# Patient Record
Sex: FEMALE | Race: WHITE | NOT HISPANIC OR LATINO | Employment: UNEMPLOYED | URBAN - METROPOLITAN AREA
[De-identification: names, ages, dates, MRNs, and addresses within clinical notes are randomized per-mention and may not be internally consistent; named-entity substitution may affect disease eponyms.]

---

## 2017-01-01 ENCOUNTER — APPOINTMENT (OUTPATIENT)
Dept: NEUROLOGY | Facility: HOSPITAL | Age: 82
End: 2017-01-01

## 2017-01-01 ENCOUNTER — APPOINTMENT (OUTPATIENT)
Dept: GENERAL RADIOLOGY | Facility: HOSPITAL | Age: 82
End: 2017-01-01

## 2017-01-01 ENCOUNTER — OFFICE VISIT (OUTPATIENT)
Dept: CARDIOLOGY | Facility: CLINIC | Age: 82
End: 2017-01-01

## 2017-01-01 ENCOUNTER — HOSPITAL ENCOUNTER (INPATIENT)
Facility: HOSPITAL | Age: 82
LOS: 1 days | End: 2017-04-19
Attending: EMERGENCY MEDICINE | Admitting: INTERNAL MEDICINE

## 2017-01-01 ENCOUNTER — APPOINTMENT (OUTPATIENT)
Dept: CT IMAGING | Facility: HOSPITAL | Age: 82
End: 2017-01-01

## 2017-01-01 VITALS
SYSTOLIC BLOOD PRESSURE: 137 MMHG | BODY MASS INDEX: 29.66 KG/M2 | HEIGHT: 65 IN | HEART RATE: 149 BPM | TEMPERATURE: 101.9 F | DIASTOLIC BLOOD PRESSURE: 78 MMHG | WEIGHT: 178 LBS | OXYGEN SATURATION: 72 % | RESPIRATION RATE: 18 BRPM

## 2017-01-01 VITALS
BODY MASS INDEX: 32.39 KG/M2 | SYSTOLIC BLOOD PRESSURE: 120 MMHG | DIASTOLIC BLOOD PRESSURE: 62 MMHG | HEART RATE: 70 BPM | HEIGHT: 62 IN | WEIGHT: 176 LBS

## 2017-01-01 DIAGNOSIS — I10 ESSENTIAL HYPERTENSION: ICD-10-CM

## 2017-01-01 DIAGNOSIS — E11.69 DIABETES MELLITUS TYPE 2 IN OBESE (HCC): ICD-10-CM

## 2017-01-01 DIAGNOSIS — Z79.02 LONG TERM (CURRENT) USE OF ANTITHROMBOTICS/ANTIPLATELETS: ICD-10-CM

## 2017-01-01 DIAGNOSIS — S06.5XAA SUBDURAL HEMATOMA, ACUTE (HCC): Primary | ICD-10-CM

## 2017-01-01 DIAGNOSIS — N18.9 CKD (CHRONIC KIDNEY DISEASE), UNSPECIFIED STAGE: ICD-10-CM

## 2017-01-01 DIAGNOSIS — T85.09XA: ICD-10-CM

## 2017-01-01 DIAGNOSIS — I42.9 CARDIOMYOPATHY (HCC): ICD-10-CM

## 2017-01-01 DIAGNOSIS — E66.9 DIABETES MELLITUS TYPE 2 IN OBESE (HCC): ICD-10-CM

## 2017-01-01 DIAGNOSIS — I50.22 CONGESTIVE HEART FAILURE, NYHA CLASS III, CHRONIC, SYSTOLIC (HCC): Primary | ICD-10-CM

## 2017-01-01 LAB
ABO GROUP BLD: NORMAL
ABO GROUP BLD: NORMAL
ALBUMIN SERPL-MCNC: 3.6 G/DL (ref 3.2–4.8)
ALBUMIN/GLOB SERPL: 1.2 G/DL (ref 1.5–2.5)
ALP SERPL-CCNC: 95 U/L (ref 25–100)
ALT SERPL W P-5'-P-CCNC: 10 U/L (ref 7–40)
ALT SERPL W P-5'-P-CCNC: 12 U/L (ref 7–40)
ANION GAP SERPL CALCULATED.3IONS-SCNC: 5 MMOL/L (ref 3–11)
APTT PPP: 26.8 SECONDS (ref 24–31)
ARTERIAL PATENCY WRIST A: ABNORMAL
ARTICHOKE IGE QN: 39 MG/DL (ref 0–130)
AST SERPL-CCNC: 20 U/L (ref 0–33)
AST SERPL-CCNC: 21 U/L (ref 0–33)
ATMOSPHERIC PRESS: ABNORMAL MMHG
BACTERIA UR QL AUTO: ABNORMAL /HPF
BASE EXCESS BLDA CALC-SCNC: -0.7 MMOL/L (ref 0–2)
BASOPHILS # BLD AUTO: 0.02 10*3/MM3 (ref 0–0.2)
BASOPHILS NFR BLD AUTO: 0.2 % (ref 0–1)
BDY SITE: ABNORMAL
BILIRUB SERPL-MCNC: 0.2 MG/DL (ref 0.3–1.2)
BILIRUB UR QL STRIP: ABNORMAL
BLD GP AB SCN SERPL QL: NEGATIVE
BUN BLD-MCNC: 38 MG/DL (ref 9–23)
BUN BLDA-MCNC: 35 MG/DL (ref 8–26)
BUN/CREAT SERPL: 23.8 (ref 7–25)
CA-I BLDA-SCNC: 1.34 MMOL/L (ref 1.2–1.32)
CALCIUM SPEC-SCNC: 9.2 MG/DL (ref 8.7–10.4)
CHLORIDE BLDA-SCNC: 103 MMOL/L (ref 98–109)
CHLORIDE SERPL-SCNC: 107 MMOL/L (ref 99–109)
CHOLEST SERPL-MCNC: 111 MG/DL (ref 0–200)
CLARITY UR: ABNORMAL
CO2 BLDA-SCNC: 27 MMOL/L (ref 24–29)
CO2 BLDA-SCNC: 27.9 MMOL/L (ref 22–33)
CO2 SERPL-SCNC: 28 MMOL/L (ref 20–31)
COHGB MFR BLD: 1 % (ref 0–2)
COLOR UR: ABNORMAL
CREAT BLD-MCNC: 1.6 MG/DL (ref 0.6–1.3)
CREAT BLDA-MCNC: 1.7 MG/DL (ref 0.6–1.3)
DEPRECATED RDW RBC AUTO: 49.7 FL (ref 37–54)
EOSINOPHIL # BLD AUTO: 0.13 10*3/MM3 (ref 0.1–0.3)
EOSINOPHIL NFR BLD AUTO: 1.6 % (ref 0–3)
ERYTHROCYTE [DISTWIDTH] IN BLOOD BY AUTOMATED COUNT: 14.7 % (ref 11.3–14.5)
GFR SERPL CREATININE-BSD FRML MDRD: 31 ML/MIN/1.73
GLOBULIN UR ELPH-MCNC: 2.9 GM/DL
GLUCOSE BLD-MCNC: 162 MG/DL (ref 70–100)
GLUCOSE BLDC GLUCOMTR-MCNC: 168 MG/DL (ref 70–130)
GLUCOSE BLDC GLUCOMTR-MCNC: 205 MG/DL (ref 70–130)
GLUCOSE UR STRIP-MCNC: NEGATIVE MG/DL
HBA1C MFR BLD: 7.1 % (ref 4.8–5.6)
HCO3 BLDA-SCNC: 26.2 MMOL/L (ref 20–26)
HCT VFR BLD AUTO: 33.7 % (ref 34.5–44)
HCT VFR BLD CALC: 31.8 %
HCT VFR BLDA CALC: 31 % (ref 38–51)
HDLC SERPL-MCNC: 35 MG/DL (ref 40–60)
HGB BLD-MCNC: 10.5 G/DL (ref 11.5–15.5)
HGB BLDA-MCNC: 10.4 G/DL (ref 14–18)
HGB BLDA-MCNC: 10.5 G/DL (ref 12–17)
HGB UR QL STRIP.AUTO: NEGATIVE
HOLD SPECIMEN: NORMAL
HOROWITZ INDEX BLD+IHG-RTO: 40 %
IMM GRANULOCYTES # BLD: 0.03 10*3/MM3 (ref 0–0.03)
IMM GRANULOCYTES NFR BLD: 0.4 % (ref 0–0.6)
INR PPP: 1.3 (ref 0.8–1.2)
KETONES UR QL STRIP: NEGATIVE
LEUKOCYTE ESTERASE UR QL STRIP.AUTO: NEGATIVE
LYMPHOCYTES # BLD AUTO: 2.13 10*3/MM3 (ref 0.6–4.8)
LYMPHOCYTES NFR BLD AUTO: 26.5 % (ref 24–44)
MAGNESIUM SERPL-MCNC: 1.9 MG/DL (ref 1.3–2.7)
MCH RBC QN AUTO: 28.7 PG (ref 27–31)
MCHC RBC AUTO-ENTMCNC: 31.2 G/DL (ref 32–36)
MCV RBC AUTO: 92.1 FL (ref 80–99)
METHGB BLD QL: 1.1 % (ref 0–1.5)
MODALITY: ABNORMAL
MONOCYTES # BLD AUTO: 0.54 10*3/MM3 (ref 0–1)
MONOCYTES NFR BLD AUTO: 6.7 % (ref 0–12)
NEUTROPHILS # BLD AUTO: 5.2 10*3/MM3 (ref 1.5–8.3)
NEUTROPHILS NFR BLD AUTO: 64.6 % (ref 41–71)
NITRITE UR QL STRIP: NEGATIVE
OXYHGB MFR BLDV: 85.9 % (ref 94–99)
PCO2 BLDA: 56.9 MM HG (ref 35–45)
PH BLDA: 7.27 PH UNITS (ref 7.35–7.45)
PH UR STRIP.AUTO: 5 [PH] (ref 5–8)
PHOSPHATE SERPL-MCNC: 3.7 MG/DL (ref 2.4–5.1)
PLATELET # BLD AUTO: 163 10*3/MM3 (ref 150–450)
PMV BLD AUTO: 9.8 FL (ref 6–12)
PO2 BLDA: 59.8 MM HG (ref 83–108)
POTASSIUM BLD-SCNC: 3.9 MMOL/L (ref 3.5–5.5)
POTASSIUM BLDA-SCNC: 3.8 MMOL/L (ref 3.5–4.9)
PROT SERPL-MCNC: 6.5 G/DL (ref 5.7–8.2)
PROT UR QL STRIP: ABNORMAL
PROTHROMBIN TIME: 15 SECONDS (ref 12.8–15.2)
RBC # BLD AUTO: 3.66 10*6/MM3 (ref 3.89–5.14)
RBC # UR: ABNORMAL /HPF
REF LAB TEST METHOD: ABNORMAL
RH BLD: POSITIVE
RH BLD: POSITIVE
SODIUM BLD-SCNC: 140 MMOL/L (ref 132–146)
SODIUM BLDA-SCNC: 141 MMOL/L (ref 138–146)
SP GR UR STRIP: 1.02 (ref 1–1.03)
SQUAMOUS #/AREA URNS HPF: ABNORMAL /HPF
TRIGL SERPL-MCNC: 165 MG/DL (ref 0–150)
TROPONIN I SERPL-MCNC: 0.03 NG/ML (ref 0–0.07)
TSH SERPL DL<=0.05 MIU/L-ACNC: 1.36 MIU/ML (ref 0.35–5.35)
UROBILINOGEN UR QL STRIP: ABNORMAL
WBC NRBC COR # BLD: 8.05 10*3/MM3 (ref 3.5–10.8)
WBC UR QL AUTO: ABNORMAL /HPF
WHOLE BLOOD HOLD SPECIMEN: NORMAL
WHOLE BLOOD HOLD SPECIMEN: NORMAL

## 2017-01-01 PROCEDURE — 85014 HEMATOCRIT: CPT

## 2017-01-01 PROCEDURE — 36430 TRANSFUSION BLD/BLD COMPNT: CPT

## 2017-01-01 PROCEDURE — 84450 TRANSFERASE (AST) (SGOT): CPT | Performed by: EMERGENCY MEDICINE

## 2017-01-01 PROCEDURE — 93005 ELECTROCARDIOGRAM TRACING: CPT | Performed by: EMERGENCY MEDICINE

## 2017-01-01 PROCEDURE — P9035 PLATELET PHERES LEUKOREDUCED: HCPCS

## 2017-01-01 PROCEDURE — 84443 ASSAY THYROID STIM HORMONE: CPT | Performed by: NURSE PRACTITIONER

## 2017-01-01 PROCEDURE — 70450 CT HEAD/BRAIN W/O DYE: CPT

## 2017-01-01 PROCEDURE — 82805 BLOOD GASES W/O2 SATURATION: CPT | Performed by: NURSE PRACTITIONER

## 2017-01-01 PROCEDURE — 80061 LIPID PANEL: CPT | Performed by: NURSE PRACTITIONER

## 2017-01-01 PROCEDURE — 25010000002 LORAZEPAM PER 2 MG: Performed by: FAMILY MEDICINE

## 2017-01-01 PROCEDURE — 83735 ASSAY OF MAGNESIUM: CPT | Performed by: NURSE PRACTITIONER

## 2017-01-01 PROCEDURE — 94640 AIRWAY INHALATION TREATMENT: CPT

## 2017-01-01 PROCEDURE — 63710000001 INSULIN REGULAR HUMAN PER 5 UNITS: Performed by: NURSE PRACTITIONER

## 2017-01-01 PROCEDURE — 95819 EEG AWAKE AND ASLEEP: CPT

## 2017-01-01 PROCEDURE — 85730 THROMBOPLASTIN TIME PARTIAL: CPT | Performed by: EMERGENCY MEDICINE

## 2017-01-01 PROCEDURE — 94799 UNLISTED PULMONARY SVC/PX: CPT

## 2017-01-01 PROCEDURE — 99213 OFFICE O/P EST LOW 20 MIN: CPT | Performed by: INTERNAL MEDICINE

## 2017-01-01 PROCEDURE — 85025 COMPLETE CBC W/AUTO DIFF WBC: CPT | Performed by: EMERGENCY MEDICINE

## 2017-01-01 PROCEDURE — 85610 PROTHROMBIN TIME: CPT

## 2017-01-01 PROCEDURE — 25010000002 LEVETIRACETAM IN NACL 0.82% 500 MG/100ML SOLUTION: Performed by: NURSE PRACTITIONER

## 2017-01-01 PROCEDURE — 71010 HC CHEST PA OR AP: CPT

## 2017-01-01 PROCEDURE — 36600 WITHDRAWAL OF ARTERIAL BLOOD: CPT | Performed by: NURSE PRACTITIONER

## 2017-01-01 PROCEDURE — 84460 ALANINE AMINO (ALT) (SGPT): CPT | Performed by: EMERGENCY MEDICINE

## 2017-01-01 PROCEDURE — 83036 HEMOGLOBIN GLYCOSYLATED A1C: CPT | Performed by: NURSE PRACTITIONER

## 2017-01-01 PROCEDURE — 86850 RBC ANTIBODY SCREEN: CPT | Performed by: EMERGENCY MEDICINE

## 2017-01-01 PROCEDURE — 25010000002 MORPHINE PER 10 MG: Performed by: FAMILY MEDICINE

## 2017-01-01 PROCEDURE — 99291 CRITICAL CARE FIRST HOUR: CPT

## 2017-01-01 PROCEDURE — 80053 COMPREHEN METABOLIC PANEL: CPT | Performed by: NURSE PRACTITIONER

## 2017-01-01 PROCEDURE — 86901 BLOOD TYPING SEROLOGIC RH(D): CPT

## 2017-01-01 PROCEDURE — 82962 GLUCOSE BLOOD TEST: CPT

## 2017-01-01 PROCEDURE — 81001 URINALYSIS AUTO W/SCOPE: CPT | Performed by: EMERGENCY MEDICINE

## 2017-01-01 PROCEDURE — 94002 VENT MGMT INPAT INIT DAY: CPT

## 2017-01-01 PROCEDURE — 84100 ASSAY OF PHOSPHORUS: CPT | Performed by: NURSE PRACTITIONER

## 2017-01-01 PROCEDURE — 99291 CRITICAL CARE FIRST HOUR: CPT | Performed by: INTERNAL MEDICINE

## 2017-01-01 PROCEDURE — 86900 BLOOD TYPING SEROLOGIC ABO: CPT

## 2017-01-01 PROCEDURE — 84484 ASSAY OF TROPONIN QUANT: CPT

## 2017-01-01 PROCEDURE — 86901 BLOOD TYPING SEROLOGIC RH(D): CPT | Performed by: EMERGENCY MEDICINE

## 2017-01-01 PROCEDURE — 5A1935Z RESPIRATORY VENTILATION, LESS THAN 24 CONSECUTIVE HOURS: ICD-10-PCS | Performed by: INTERNAL MEDICINE

## 2017-01-01 PROCEDURE — 86900 BLOOD TYPING SEROLOGIC ABO: CPT | Performed by: EMERGENCY MEDICINE

## 2017-01-01 PROCEDURE — 99221 1ST HOSP IP/OBS SF/LOW 40: CPT | Performed by: NEUROLOGICAL SURGERY

## 2017-01-01 PROCEDURE — 25010000002 MORPHINE SULFATE (PF) 2 MG/ML SOLUTION: Performed by: FAMILY MEDICINE

## 2017-01-01 PROCEDURE — 93289 INTERROG DEVICE EVAL HEART: CPT | Performed by: INTERNAL MEDICINE

## 2017-01-01 PROCEDURE — 80047 BASIC METABLC PNL IONIZED CA: CPT

## 2017-01-01 RX ORDER — GLYCOPYRROLATE 0.2 MG/ML
0.4 INJECTION INTRAMUSCULAR; INTRAVENOUS EVERY 4 HOURS PRN
Status: DISCONTINUED | OUTPATIENT
Start: 2017-01-01 | End: 2017-04-20 | Stop reason: HOSPADM

## 2017-01-01 RX ORDER — MORPHINE SULFATE 4 MG/ML
4 INJECTION, SOLUTION INTRAMUSCULAR; INTRAVENOUS
Status: DISCONTINUED | OUTPATIENT
Start: 2017-01-01 | End: 2017-04-20 | Stop reason: HOSPADM

## 2017-01-01 RX ORDER — SODIUM CHLORIDE 9 MG/ML
100 INJECTION, SOLUTION INTRAVENOUS CONTINUOUS
Status: DISCONTINUED | OUTPATIENT
Start: 2017-01-01 | End: 2017-04-20 | Stop reason: HOSPADM

## 2017-01-01 RX ORDER — DEXTROSE MONOHYDRATE 25 G/50ML
25 INJECTION, SOLUTION INTRAVENOUS
Status: DISCONTINUED | OUTPATIENT
Start: 2017-01-01 | End: 2017-04-20 | Stop reason: HOSPADM

## 2017-01-01 RX ORDER — ACETYLCYSTEINE 100 MG/ML
4 SOLUTION ORAL; RESPIRATORY (INHALATION)
Status: DISCONTINUED | OUTPATIENT
Start: 2017-01-01 | End: 2017-04-20 | Stop reason: HOSPADM

## 2017-01-01 RX ORDER — NICOTINE POLACRILEX 4 MG
15 LOZENGE BUCCAL
Status: DISCONTINUED | OUTPATIENT
Start: 2017-01-01 | End: 2017-04-20 | Stop reason: HOSPADM

## 2017-01-01 RX ORDER — LORAZEPAM 2 MG/ML
1 INJECTION INTRAMUSCULAR EVERY 4 HOURS PRN
Status: DISCONTINUED | OUTPATIENT
Start: 2017-01-01 | End: 2017-04-20 | Stop reason: HOSPADM

## 2017-01-01 RX ORDER — CHLORHEXIDINE GLUCONATE 0.12 MG/ML
15 RINSE ORAL EVERY 12 HOURS SCHEDULED
Status: DISCONTINUED | OUTPATIENT
Start: 2017-01-01 | End: 2017-04-20 | Stop reason: HOSPADM

## 2017-01-01 RX ORDER — SODIUM CHLORIDE 0.9 % (FLUSH) 0.9 %
1-10 SYRINGE (ML) INJECTION AS NEEDED
Status: DISCONTINUED | OUTPATIENT
Start: 2017-01-01 | End: 2017-04-20 | Stop reason: HOSPADM

## 2017-01-01 RX ORDER — MORPHINE SULFATE 2 MG/ML
2 INJECTION, SOLUTION INTRAMUSCULAR; INTRAVENOUS
Status: DISCONTINUED | OUTPATIENT
Start: 2017-01-01 | End: 2017-04-20 | Stop reason: HOSPADM

## 2017-01-01 RX ORDER — LEVETIRACETAM 5 MG/ML
500 INJECTION INTRAVASCULAR EVERY 12 HOURS SCHEDULED
Status: DISCONTINUED | OUTPATIENT
Start: 2017-01-01 | End: 2017-04-20 | Stop reason: HOSPADM

## 2017-01-01 RX ORDER — PANTOPRAZOLE SODIUM 40 MG/10ML
40 INJECTION, POWDER, LYOPHILIZED, FOR SOLUTION INTRAVENOUS
Status: DISCONTINUED | OUTPATIENT
Start: 2017-01-01 | End: 2017-04-20 | Stop reason: HOSPADM

## 2017-01-01 RX ORDER — ONDANSETRON 2 MG/ML
4 INJECTION INTRAMUSCULAR; INTRAVENOUS EVERY 6 HOURS PRN
Status: DISCONTINUED | OUTPATIENT
Start: 2017-01-01 | End: 2017-04-20 | Stop reason: HOSPADM

## 2017-01-01 RX ORDER — SODIUM CHLORIDE 0.9 % (FLUSH) 0.9 %
10 SYRINGE (ML) INJECTION AS NEEDED
Status: DISCONTINUED | OUTPATIENT
Start: 2017-01-01 | End: 2017-04-20 | Stop reason: HOSPADM

## 2017-01-01 RX ORDER — LEVOTHYROXINE SODIUM ANHYDROUS 100 UG/5ML
75 INJECTION, POWDER, LYOPHILIZED, FOR SOLUTION INTRAVENOUS DAILY
Status: DISCONTINUED | OUTPATIENT
Start: 2017-01-01 | End: 2017-04-20 | Stop reason: HOSPADM

## 2017-01-01 RX ADMIN — MORPHINE SULFATE 4 MG: 4 INJECTION, SOLUTION INTRAMUSCULAR; INTRAVENOUS at 22:12

## 2017-01-01 RX ADMIN — GLYCOPYRROLATE 0.4 MG: 0.2 INJECTION, SOLUTION INTRAMUSCULAR; INTRAVENOUS at 20:20

## 2017-01-01 RX ADMIN — MORPHINE SULFATE 2 MG: 2 INJECTION, SOLUTION INTRAMUSCULAR; INTRAVENOUS at 20:20

## 2017-01-01 RX ADMIN — ALBUTEROL SULFATE 2.5 MG: 2.5 SOLUTION RESPIRATORY (INHALATION) at 19:48

## 2017-01-01 RX ADMIN — LEVETIRACETAM 500 MG: 5 INJECTION INTRAVENOUS at 08:15

## 2017-01-01 RX ADMIN — ALBUTEROL SULFATE 2.5 MG: 2.5 SOLUTION RESPIRATORY (INHALATION) at 12:27

## 2017-01-01 RX ADMIN — LORAZEPAM 1 MG: 2 INJECTION INTRAMUSCULAR; INTRAVENOUS at 20:51

## 2017-01-01 RX ADMIN — PANTOPRAZOLE SODIUM 40 MG: 40 INJECTION, POWDER, FOR SOLUTION INTRAVENOUS at 06:03

## 2017-01-01 RX ADMIN — CHLORHEXIDINE GLUCONATE 15 ML: 1.2 RINSE ORAL at 04:57

## 2017-01-01 RX ADMIN — SODIUM CHLORIDE 100 ML/HR: 9 INJECTION, SOLUTION INTRAVENOUS at 03:45

## 2017-01-01 RX ADMIN — ACETYLCYSTEINE 4 ML: 100 SOLUTION ORAL; RESPIRATORY (INHALATION) at 12:27

## 2017-01-01 RX ADMIN — ACETYLCYSTEINE 4 ML: 100 SOLUTION ORAL; RESPIRATORY (INHALATION) at 19:48

## 2017-01-01 RX ADMIN — CHLORHEXIDINE GLUCONATE 15 ML: 1.2 RINSE ORAL at 08:15

## 2017-01-01 RX ADMIN — INSULIN HUMAN 5 UNITS: 100 INJECTION, SOLUTION PARENTERAL at 06:04

## 2017-01-01 RX ADMIN — CHLORHEXIDINE GLUCONATE 15 ML: 1.2 RINSE ORAL at 20:53

## 2017-04-14 NOTE — PROGRESS NOTES
Tracy Gutierrez  1935  [phone]      04/14/2017    [facility] CARDIOLOGY     Katepablo Robles MD  [address] [place] [address]    Chief Complaint   Patient presents with   • Congestive Heart Failure   • Hypertension       Problem List:   PROBLEM LIST:  1. Coronary artery disease:  a. Heart catheterization, 09/10/2001, Dr. Coles with 80% circumflex disease, status post PTCA and stenting to the mid circumflex artery, Dr. Adama Jasmine, 09/11/2001, LVEF 35%.  b. Cardiac catheterization, 05/02/2007, Dr. Fritz Darling, PTCA and stent of a mid to diastolic circumflex coronary artery lesion reducing 70% in-stent stenosis to 0% with a 4.0 X 18 mm  bare metal stent, LVEF 40%.  c. Cardiac catheterization, 09/14/2007, Dr. Saeed Ohara: LVEF (30%), noncritical artery disease.  d. Preliminary echocardiogram, 03/25/2009 showing LVH, LAE at 4.2, sclerotic aortic valve, global hypokinesis, EF 35% to 40%.   e. Echocardiogram, 12/14/2011, left ventricular ejection fraction of 45% to 50%, mild MR, moderate TR.   2. Class III congestive heart failure:  a. Implantation of a biventricular pacemaker ICD, 09/17/2007 with implantation of a CPI device by Dr. Bonifacio Blevins.  b. Echocardiogram, January 2014; mild TR, AI and ejection fraction 50%.  c. Acute exacerbation of systolic heart failure with admission to [facility], 09/11/2015 through 09/14/2015.   3. Left bundle branch block.  4. Peripheral vascular disease:  a. Renal stents spring 2007 at [facility], database incomplete.  b. Uncontrolled hypertension.  c. Renal angiogram in conjunction with cardiac catheterization, 09/14/2007 with 90% left renal artery stenosis reduced to 0% with a 4 x 12 mm Herculink and 4 x 12 mm Liberté stent (Dr. Saeed Ohara).  5. Hypertension.  6. Dyslipidemia.   7. Diabetes.  8. History of cerebrovascular accident in 1990 with left-sided weakness.  9. Arthritis.  10. Leukemia,  1987.  11. Obesity.  12. Hypothyroidism.  13. Possible TIA, April 2013.  14. Surgical history:  a. Hysterectomy.  b. Bilateral cataracts.  c. Cholecystectomy.  d. Knee replacement.  e. Right arm surgery.  Allergies  No Known Allergies    Current Medications    Current Outpatient Prescriptions:   •  amLODIPine (NORVASC) 5 MG tablet, Take 10 mg by mouth daily., Disp: , Rfl:   •  carvedilol (COREG) 12.5 MG tablet, Take 12.5 mg by mouth 2 (two) times a day with meals., Disp: , Rfl:   •  clopidogrel (PLAVIX) 75 MG tablet, Take 1 tablet by mouth daily., Disp: 90 tablet, Rfl: 2  •  esomeprazole (NexIUM) 40 MG capsule, Take 40 mg by mouth every morning before breakfast., Disp: , Rfl:   •  furosemide (LASIX) 20 MG tablet, Take 1 tablet by mouth daily., Disp: 90 tablet, Rfl: 2  •  hydrALAZINE (APRESOLINE) 50 MG tablet, Take 50 mg by mouth 2 (two) times a day., Disp: , Rfl:   •  insulin glargine (LANTUS) 100 UNIT/ML injection, Inject 10 Units under the skin every morning., Disp: , Rfl:   •  insulin glargine (LANTUS) 100 UNIT/ML injection, Inject 15 Units under the skin every night., Disp: , Rfl:   •  levothyroxine (SYNTHROID, LEVOTHROID) 125 MCG tablet, Take 125 mcg by mouth daily., Disp: , Rfl:   •  naproxen (EC NAPROSYN) 500 MG EC tablet, Take 1 tablet by mouth 2 (two) times a day as needed for mild pain (1-3)., Disp: 12 tablet, Rfl: 0  •  sertraline (ZOLOFT) 50 MG tablet, Take 50 mg by mouth daily., Disp: , Rfl:   •  simvastatin (ZOCOR) 80 MG tablet, Take 80 mg by mouth every night., Disp: , Rfl:   •  temazepam (RESTORIL) 15 MG capsule, Take 15 mg by mouth at night as needed for sleep., Disp: , Rfl:   •  tolterodine LA (DETROL LA) 4 MG 24 hr capsule, Take 4 mg by mouth daily., Disp: , Rfl:     History of Present Illness   HPI    Pt presents for follow up of CHF/HTN. Since the pt has seen us, pt denies any palpitations, SOB, CP, LH, and dizziness. Denies any hospitalizations, ER visits, ICD shocks, or TIA/CVA symptoms.  "Overall feels well. No side effects to medications. Daughter passed away recently due to liver cirrhosis. Pt per family has not done very well with worsening memory. Now with emesis green bile per daughter none since two weeks.    ROS:  General:  + fatigue, No weight gain or loss  Cardiovascular:  Denies CP, PND, syncope, near syncope, edema or palpitations.  Pulmonary:  Denies DELVALLE, cough, or wheezing    Vitals:    04/14/17 1122   BP: 120/62   BP Location: Left arm   Pulse: 70   Weight: 176 lb (79.8 kg)   Height: 62\" (157.5 cm)       PE:  General: NAD  Neck: no JVD, no carotid bruits, no TM  Heart RRR, NL S1, S2, S4 present, no rubs, murmurs  Lungs: CTA, no wheezes, rhonchi, or rales  Abd: soft, non-tender, NL BS  Ext: No musculoskeletal deformities, no edema, cyanosis, or clubbing  Psych: normal mood and affect    Diagnostic Data:  Procedures.    1. Congestive heart failure, NYHA class III, chronic, systolic    2. Cardiomyopathy    3. Essential hypertension        ICD interrogation: NL fxn, NL battery fxn, No VT or AF, <1% RV paced    Plan:  1) CHF: LV lead turned off: doing well all things considered   Continue present medications. ICD function normal.   2) Anticoagulation  Continue Plavix/ASA  3) HTN  Wt loss, exercise, salt reduction  4)CSF shunt: needs referral back to neurosurgeron Dr Palacios    F/up in 6 months    IBonifacio MD, personally performed the services face to face as described in this documentation and as scribed by the above named individual in my presence, and it is both accurate and complete.  4/14/2017  11:49 AM     "

## 2017-04-19 PROBLEM — Z85.6 HISTORY OF LEUKEMIA: Status: ACTIVE | Noted: 2017-01-01

## 2017-04-19 PROBLEM — S06.5XAA SDH (SUBDURAL HEMATOMA) (HCC): Status: ACTIVE | Noted: 2017-01-01

## 2017-04-19 PROBLEM — S06.5XAA SUBDURAL HEMATOMA, ACUTE (HCC): Status: ACTIVE | Noted: 2017-01-01

## 2017-04-19 PROBLEM — N18.9 CKD (CHRONIC KIDNEY DISEASE): Status: ACTIVE | Noted: 2017-01-01

## 2017-04-19 PROBLEM — J96.00 ACUTE RESPIRATORY FAILURE (HCC): Status: ACTIVE | Noted: 2017-01-01

## 2017-04-19 PROBLEM — I25.5 ISCHEMIC CARDIOMYOPATHY: Status: ACTIVE | Noted: 2017-01-01

## 2017-04-19 NOTE — CONSULTS
"Palliative Care Progress Note    Patient Name: Tracy Gutierrez   : 1935  Sex: female    Code Status: Comfort measures and allow natural death.    Date of Admission: 2017    Subjective:     Consulted for Advance Care Planning  - pt is now comfort measures    Pt admitted to ICU overnight with a very large acute subdural hematoma on the left side with a midline shift and brainstem herniation. This is unfortunately an irreversible, terminal injury/diagnosis.     Discussion with family at bedside - family knows pt does not want to be \"kept on life support\". Family is gathering and will let staff know when the family is ready for the palliative extubation - today or possibly tomorrow. Pt's comfort is the most important goal at this time.     Pt does have an ICD: Implantation of a biventricular pacemaker ICD, 2007 with implantation of a CPI device by Dr. Bonifacio Blevins. TopBlip. -- Magnet placed -- family aware    ROS:  Review of Systems   Unable to perform ROS: Patient unresponsive     Reviewed scheduled and prn medications.    sodium chloride 100 mL/hr Last Rate: 100 mL/hr (17 0345)     dextrose  •  dextrose  •  glucagon (human recombinant)  •  ondansetron  •  sodium chloride  •  sodium chloride    Objective:   /59  Pulse 82  Temp 100.2 °F (37.9 °C) (Core)   Resp 18  Ht 65\" (165.1 cm)  Wt 178 lb (80.7 kg)  LMP  (LMP Unknown)  SpO2 98%  BMI 29.62 kg/m2   Intake & Output (last day)        0701 -  0700  0701 -  0700    I.V. (mL/kg) 235 (2.9) 216 (2.7)    Blood 242 76.7    IV Piggyback  100    Total Intake(mL/kg) 477 (5.9) 392.7 (4.9)    Urine (mL/kg/hr)  225 (0.4)    Total Output   225    Net +477 +167.7              Lab Results (last 24 hours)     Procedure Component Value Units Date/Time    POC Protime / INR [[id number]]  (Abnormal) Collected:  17    Specimen:  Blood Updated:  17     Protime 15.0 seconds      INR 1.3 (H)      Serial " Number: [id number]    : [id number]       POC CHEM 8 [[id number]]  (Abnormal) Collected:  04/19/17 0148    Specimen:  Blood Updated:  04/19/17 0155     Glucose 168 (H) mg/dL      BUN, Arterial 35 (H) mg/dL      Creatinine 1.70 (H) mg/dL      Sodium 141 mmol/L      Potassium 3.8 mmol/L      Chloride 103 mmol/L      Total CO2 27 mmol/L      Hemoglobin 10.5 (L) g/dL       Serial Number: [id number]    : [id number]        Hematocrit 31 (L) %      Ionized Calcium 1.34 (H) mmol/L     Blood Gas, Arterial [[id number]]  (Abnormal) Collected:  04/19/17 0153    Specimen:  Arterial Blood Updated:  04/19/17 0158     Site Arterial: right radial     Homero's Test N/A     pH, Arterial 7.272 (L) pH units      pCO2, Arterial 56.9 (H) mm Hg      pO2, Arterial 59.8 (L) mm Hg      HCO3, Arterial 26.2 (H) mmol/L      Base Excess, Arterial -0.7 (L) mmol/L      Hemoglobin, Blood Gas 10.4 (L) g/dL      Hematocrit, Blood Gas 31.8 %      Oxyhemoglobin 85.9 (L) %      Methemoglobin 1.1 %      Carboxyhemoglobin 1.0 %      CO2 Content 27.9     Barometric Pressure for Blood Gas -- mmHg       N/A        Modality Ventilator     FIO2 40 %     Narrative:       One Hour After Intubation    POC Troponin, Rapid [[id number]]  (Normal) Collected:  04/19/17 0204    Specimen:  Blood Updated:  04/19/17 0248     Troponin I 0.03 ng/mL       Serial Number: [id number]    : [id number]       aPTT [[id number]]  (Normal) Collected:  04/19/17 0149    Specimen:  Blood Updated:  04/19/17 0309     PTT 26.8 seconds     Narrative:       PTT = The equivalent PTT values for the therapeutic range of heparin levels at 0.3 to 0.5 U/ml are 45 to 60 seconds.    CBC & Differential [[id number]] Collected:  04/19/17 0149    Specimen:  Blood Updated:  04/19/17 0311    Narrative:       The following orders were created for panel order CBC & Differential.  Procedure                               Abnormality         Status                     ---------                               -----------          ------                     CBC Auto Differential[[id number]]         Abnormal            Final result                 Please view results for these tests on the individual orders.    CBC Auto Differential [[id number]]  (Abnormal) Collected:  04/19/17 0149    Specimen:  Blood Updated:  04/19/17 0311     WBC 8.05 10*3/mm3      RBC 3.66 (L) 10*6/mm3      Hemoglobin 10.5 (L) g/dL      Hematocrit 33.7 (L) %      MCV 92.1 fL      MCH 28.7 pg      MCHC 31.2 (L) g/dL      RDW 14.7 (H) %      RDW-SD 49.7 fl      MPV 9.8 fL      Platelets 163 10*3/mm3      Neutrophil % 64.6 %      Lymphocyte % 26.5 %      Monocyte % 6.7 %      Eosinophil % 1.6 %      Basophil % 0.2 %      Immature Grans % 0.4 %      Neutrophils, Absolute 5.20 10*3/mm3      Lymphocytes, Absolute 2.13 10*3/mm3      Monocytes, Absolute 0.54 10*3/mm3      Eosinophils, Absolute 0.13 10*3/mm3      Basophils, Absolute 0.02 10*3/mm3      Immature Grans, Absolute 0.03 10*3/mm3     AST [[id number]]  (Normal) Collected:  04/19/17 0149    Specimen:  Blood Updated:  04/19/17 0316     AST (SGOT) 21 U/L     ALT [[id number]]  (Normal) Collected:  04/19/17 0149    Specimen:  Blood Updated:  04/19/17 0316     ALT (SGPT) 10 U/L     Urinalysis With / Culture If Indicated [[id number]]  (Abnormal) Collected:  04/19/17 0157    Specimen:  Urine from Urine, Catheter Updated:  04/19/17 0316     Color, UA Dark Yellow (A)     Appearance, UA Cloudy (A)     pH, UA 5.0     Specific Gravity, UA 1.020     Glucose, UA Negative     Ketones, UA Negative     Bilirubin, UA Small (1+) (A)     Blood, UA Negative     Protein, UA Trace (A)     Leuk Esterase, UA Negative     Nitrite, UA Negative     Urobilinogen, UA 1.0 E.U./dL    Urinalysis, Microscopic Only [[id number]]  (Abnormal) Collected:  04/19/17 0157    Specimen:  Urine from Urine, Catheter Updated:  04/19/17 0320     RBC, UA 0-2 /HPF      WBC, UA 0-2 (A) /HPF      Bacteria, UA None Seen /HPF      Squamous Epithelial Cells, UA -- /HPF       Methodology Automated Microscopy    TSH [[id number]]  (Normal) Collected:  04/19/17 0149    Specimen:  Blood Updated:  04/19/17 0346     TSH 1.359 mIU/mL     Lipid Panel [[id number]]  (Abnormal) Collected:  04/19/17 0149    Specimen:  Blood Updated:  04/19/17 0346     Total Cholesterol 111 mg/dL      Triglycerides 165 (H) mg/dL      HDL Cholesterol 35 (L) mg/dL      LDL Cholesterol  39 mg/dL     Narrative:       Cholesterol Reference Ranges:   Desirable       < 200 mg/dL   Borderline    200-239 mg/dL   High Risk       > 239 mg/dL    Triglyceride Reference Ranges:   Normal          < 150 mg/dL   Borderline    150-199 mg/dL   High          200-499 mg/dL   Very High       > 499 mg/dL    HDL Reference Ranges:   Low              < 40 mg/dL   High             > 59 mg/dL    LDL Reference Ranges:   Optimal         < 100 mg/dL   Near Optimal  100-129 mg/dL   Borderline    130-159 mg/dL   High          160-189 mg/dL   Very High       > 189 mg/dL    Comprehensive Metabolic Panel [[id number]]  (Abnormal) Collected:  04/19/17 0149    Specimen:  Blood Updated:  04/19/17 0346     Glucose 162 (H) mg/dL      BUN 38 (H) mg/dL      Creatinine 1.60 (H) mg/dL      Sodium 140 mmol/L      Potassium 3.9 mmol/L      Chloride 107 mmol/L      CO2 28.0 mmol/L      Calcium 9.2 mg/dL      Total Protein 6.5 g/dL      Albumin 3.60 g/dL      ALT (SGPT) 12 U/L      AST (SGOT) 20 U/L      Alkaline Phosphatase 95 U/L      Total Bilirubin 0.2 (L) mg/dL      eGFR Non African Amer 31 (L) mL/min/1.73      Globulin 2.9 gm/dL      A/G Ratio 1.2 (L) g/dL      BUN/Creatinine Ratio 23.8     Anion Gap 5.0 mmol/L     Narrative:       National Kidney Foundation Guidelines    Stage                           Description                             GFR                      1                               Normal or High                          90+  2                               Mild decrease                            60-89  3                               Moderate  decrease                   30-59  4                               Severe decrease                       15-29  5                               Kidney failure                             <15    Magnesium [[id number]]  (Normal) Collected:  04/19/17 0149    Specimen:  Blood Updated:  04/19/17 0346     Magnesium 1.9 mg/dL     Phosphorus [[id number]]  (Normal) Collected:  04/19/17 0149    Specimen:  Blood Updated:  04/19/17 0346     Phosphorus 3.7 mg/dL     Lavender Top [[id number]] Collected:  04/19/17 0149    Specimen:  Blood Updated:  04/19/17 0401     Extra Tube hold for add-on      Auto resulted       Light Blue Top [[id number]] Collected:  04/19/17 0149    Specimen:  Blood Updated:  04/19/17 0401     Extra Tube hold for add-on      Auto resulted       Green Top (Gel) [[id number]] Collected:  04/19/17 0149    Specimen:  Blood Updated:  04/19/17 0401     Extra Tube Hold for add-ons.      Auto resulted.       Hemoglobin A1c [[id number]]  (Abnormal) Collected:  04/19/17 0149    Specimen:  Blood Updated:  04/19/17 0431     Hemoglobin A1C 7.10 (H) %     Narrative:       The American Diabetes Association recommends maintenance of Hemoglobin A1C at 7.0% or lower. Goals for Hemoglobin A1C reduction may need to be modified if hypoglycemia is a problem.    [place] Draw [[id number]] Collected:  04/19/17 0149    Specimen:  Blood Updated:  04/19/17 0512    Narrative:       The following orders were created for panel order [place] Draw.  Procedure                               Abnormality         Status                     ---------                               -----------         ------                     Light Blue Top[[id number]]                                    Final result               Green Top (Gel)[[id number]]                                   Final result               Lavender Top[[id number]]                                      Final result               Gold Top - SST[[id number]]                                                                Green Top (No Gel)[[id number]]                                                             Please view results for these tests on the individual orders.    POC Glucose Fingerstick [[id number]]  (Abnormal) Collected:  04/19/17 0526    Specimen:  Blood Updated:  04/19/17 0532     Glucose 205 (H) mg/dL     Narrative:       Meter: [id number] : [id number] Yuli Lopes        Imaging Results (last 24 hours)     Procedure Component Value Units Date/Time    CT Head Without Contrast [[id number]]  (Abnormal) Collected:  04/19/17 0124     Updated:  04/19/17 0146    Addenda:        IMPRESSION:       Large LEFT frontoparietal and temporal ACUTE SUBDURAL HEMATOMA with   trace   subarachnoid extension and small subdural hematoma along the RIGHT   frontoparietal convexity, along the falx and tentorium.      Mass effect, midline shift to the RIGHT by 8-9 mm with subfalcine   herniation.    Recommend neurosurgical consultation.    THIS REPORT CONTAINS FINDINGS THAT MAY BE CRITICAL TO PATIENT CARE. The   findings were verbally communicated via telephone conference with   [Alberto Sanders] at 1:46 AM EDT on 4/19/2017. The findings were acknowledged and   understood.    THIS DOCUMENT HAS BEEN ELECTRONICALLY SIGNED BY MELISSA GALLEGOS MD  Signed:  04/19/17 0146 by Melissa Gallegos MD    Narrative:         EXAM:    CT Head Without Intravenous Contrast    CLINICAL HISTORY:    81 years old, female; Signs and symptoms; Other: See notes; Patient HX: AMS;   Additional info: CVA    TECHNIQUE:    Axial computed tomography images of the head/brain without intravenous   contrast.    COMPARISON:    No relevant prior studies available.    FINDINGS:    Large LEFT temporal, frontoparietal acute subdural hematoma measuring   approximately 1.7 cm in maximal width.  Extension of subdural hematoma along   the falx and tentorium as well as a smaller RIGHT frontoparietal subdural   hematoma measuring 4 mm in maximal width.  Trace LEFT frontoparietal    subarachnoid hemorrhage underlying the subdural hematoma.      Mass effect with attenuation of the sulci, of the LEFT lateral ventricle with   midline shift to the RIGHT by 8-9 mm and associated subfalcine herniation.    LEFT frontal ventriculostomy catheter with its tip in the 3rd ventricle.    Patient is intubated with secretions in the nasopharynx and paranasal   sinuses/nasal cavities.  No evidence of mastoid effusion.       Impression:         Large LEFT frontoparietal and temporal ACUTE SUBDURAL HEMATOMA with trace   subarachnoid extension and small subdural hematoma along the RIGHT   frontoparietal convexity, along the falx and tentorium.      Mass effect, midline shift to the RIGHT by 8-9 mm with subfalcine herniation.    Recommend neurosurgical consultation.    THIS DOCUMENT HAS BEEN ELECTRONICALLY SIGNED BY GRACE MAIER MD    XR Chest 1 View [[id number]] Collected:  04/19/17 1033     Updated:  04/19/17 1109    Narrative:       EXAMINATION: XR CHEST 1 VW- 04/19/2017     INDICATION: Acute Stroke Protocol (onset < 12 hrs)      COMPARISON: 09/18/2007     FINDINGS: Portable chest reveals cardiac pacer leads in satisfactory  position. Endotracheal tube at the level of the swetha. Slight  retraction is recommended. Degenerative changes seen within the spine.  Mild increased markings seen within the left mid and lower lung field.  Heart is enlarged. The right lung is clear. Degenerative changes seen  within the spine.       Impression:       Mild increased markings seen within the left mid and lower  lung field. Heart is enlarged. The endotracheal tube is at the level of  the swetha in which slight retraction is recommended.     D:  04/19/2017  E:  04/19/2017     This report was finalized on 4/19/2017 11:07 AM by Dr. Carley Slaon MD.             PPS: Palliative Performance Scale score as of 4/19/2017, 2:05 PM is 10% based on the following measures:   Ambulation: Totally bed bound  Activity and Evidence of  Disease: Unable to do any work, extensive evidence of disease  Self-Care: Total care  Intake:  Mouth care only  LOC: Drowsy or coma    Physical Exam:  Physical Exam   Constitutional: No distress.   HENT:   Head: Normocephalic.   Mouth/Throat: No oropharyngeal exudate.   R scalp shunt palpable   Eyes: Right eye exhibits no discharge. Left eye exhibits no discharge.   Pupils non reactive   Neck: No JVD present. No tracheal deviation present.   Cardiovascular: Normal rate and regular rhythm.  Exam reveals no gallop and no friction rub.    No murmur heard.  Pulmonary/Chest: No respiratory distress. She has no wheezes. She has no rales.   On vent - air movement noted throughout   Abdominal: Soft. Bowel sounds are normal. She exhibits no distension. There is no tenderness.   Musculoskeletal: She exhibits no edema or deformity.   Neurological: GCS eye subscore is 1. GCS verbal subscore is 1. GCS motor subscore is 1.   Does not follow commands   Skin: Skin is dry. She is not diaphoretic. There is pallor.   Psychiatric:   Unable to assess     Principal Problem:    Massive acute right sided subdural hematoma with herniation in deep coma secondary to trauma 4/18/17. Not operative candidate  Active Problems:    CAD with stents 2001 and 2007    Congestive heart failure with LVEF improved to 50% on echo 2014 after BIV ICD     Peripheral vascular disease with 90% left renal artery stenosis s/p left renal 9/14/2007    Hypertension    Dyslipidemia    Diabetes mellitus; on insulin therapy    H/O Stroke 1990 w/ residual left-sided weakness    Hypothyroidism    Acute respiratory failure due to neurologic event    CKD (chronic kidney disease)    Ischemic cardiomyopathy s/p BIV ICD 9/17/2007    History of leukemia 1987      Assessment/Plan:     - Continue to provide support for pt, family, staff  - Medications for comfort for impending palliative extubation  - Keep pacemaker on pt's skin over ICD taped in place. (Leave magnet on even  after death) (other option if pt is to remain intubated for a more extended period of time - which is not expected at this time - is to Contact [employer] to deactivate ICD).    Total Time with Patient: 60 minutes  Time In: 1200, 1445  Time Out: 1230, 1515    Time spent reviewing medical record, assessing and examining patient, discussing with family, nursing, answering questions, visiting room/floor, formulating a plan of care, and documentation of care.  > 50% face to face.    Justification for care:  Patient meets criteria for acute in-patient care with required nursing assessment and interventions for symptoms with IV medications.      ARPITA Harvey  (C) [phone]  (O) [phone]  04/19/17  1:59 PM

## 2017-04-19 NOTE — PROGRESS NOTES
Discharge Planning Assessment  [facility]     Patient Name: Tracy Gutierrez  MRN: [id number]  Today's Date: 4/19/2017    Admit Date: 4/19/2017          Discharge Needs Assessment       04/19/17 1117    Living Environment    Lives With child(macrina), adult    Living Arrangements house    Quality Of Family Relationships supportive    Able to Return to Prior Living Arrangements yes    Discharge Needs Assessment    Concerns To Be Addressed coping/stress concerns;grief and loss concerns    Readmission Within The Last 30 Days no previous admission in last 30 days    Equipment Currently Used at Home walker, standard            Discharge Plan       04/19/17 1118    Case Management/Social Work Plan    Plan Palliative consult.  Poor prognosis    Additional Comments Ms. Gutierrez is critically ill.  Family meeting with Palliative.  Poor prognosis.        Discharge Placement     No information found        Expected Discharge Date and Time     Expected Discharge Date Expected Discharge Time    Apr 24, 2017               Demographic Summary       04/19/17 1116    Referral Information    Admission Type inpatient    Arrived From admitted as an inpatient    Referral Source admission list    Reason For Consult discharge planning    Contact Information    Permission Granted to Share Information With             Functional Status       04/19/17 1116    Functional Status Current    Ambulation 4-->completely dependent    Transferring 4-->completely dependent    Toileting 4-->completely dependent    Bathing 4-->completely dependent    Dressing 4-->completely dependent    Eating 4-->completely dependent    Communication 3-->unable to understand or speak (not related to language barrier)    Swallowing (if score 2 or more for any item, consult Rehab Services) 2-->difficulty swallowing liquids/foods    Change in Functional Status Since Onset of Current Illness/Injury yes    Functional Status Prior    Ambulation 0-->independent     Transferring 0-->independent    Toileting 0-->independent    Bathing 0-->independent    Dressing 0-->independent    Eating 0-->independent    Communication 0-->understands/communicates without difficulty    IADL    Medications independent    Meal Preparation independent    Housekeeping independent    Laundry independent    Shopping independent    Oral Care independent            Psychosocial     None            Abuse/Neglect     None            Legal     None            Substance Abuse     None            Patient Forms     None          Latonia Polanco RN

## 2017-04-19 NOTE — H&P
Pulmonary/Critical Care History and Physical Exam     LOS: 0 days   Patient Care Team:  Kate Robles MD as PCP - General    Chief Complaint:    Chief Complaint   Patient presents with   • Stroke       Subjective     HPI:  Tracy Gutierrez is a 81 y.o. 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   The patient was LKW at approximately 2200 when she went to bed.  Apprarently she had fallen earlier in the evening and hit the L side of her head against the piano but was w/o complaint.  Her family checked on her around midnight and found her slumped over w/ decreased mental status.  Upon EMS arrival the patient was having frequent emesis and difficulty protecting airway and so patient was intubated and transferred to air transport.  Shortly before arriving at [facility] she had an incidence of decreased BP and HR and received 1 mg of Epi.  Upon arrival here she was taken to CT scan which revealed a large L SDH.  Dr. Sanders spoke w/ Dr. Dexter (Neurosurgery) who evaluated the patient.  Dr. Dexter felt the patient was not an operative candidate.  Prognosis is poor.  The patient is on ASA/Plavix at home and has had 2 units of platelets ordered in the ED.   The patient's daughter is present at the bedside.     History taken from: records & daughter    Past Medical History:   Diagnosis Date   • Arthritis    • CAD (coronary artery disease)    • Congestive heart failure, NYHA class III    • Diabetes mellitus    • Dyslipidemia    • Hypertension    • Hypothyroidism    • Left bundle branch block    • Leukemia 01/01/1987   • Lower extremity edema    • Obesity    • Peripheral vascular disease    • Stroke 01/01/1990     History of cerebrovascular accident in 1990 with left-sided weakness.   • TIA (transient ischemic attack) 04/01/2013    POSSIBLE TIA       Past Surgical History:   Procedure Laterality Date   • CHOLECYSTECTOMY     • EYE SURGERY Bilateral     CATARACTS   • HYSTERECTOMY     • JOINT REPLACEMENT     • ORTHOPEDIC SURGERY Right     RIGHT ARM SURGERY    • TOTAL KNEE ARTHROPLASTY         History reviewed. No pertinent family history.    Social History     Social History   • Marital status:      Spouse name: N/A   • Number of children: N/A   • Years of education: N/A     Occupational History   • Not on file.     Social History Main Topics   • Smoking status: Never Smoker   • Smokeless tobacco: Never Used   • Alcohol use No   • Drug use: No   • Sexual activity: Defer     Other Topics Concern   • Not on file     Social History Narrative       No Known Allergies    PMH/FH/SocH were reviewed by me and updates were made.     Review of Systems:    All systems were reviewed and negative except as noted in subjective.  +  For frequent falls    Objective     Vital Signs  Temp:  [92.3 °F (33.5 °C)] 92.3 °F (33.5 °C)  Heart Rate:  [59-70] 64  Resp:  [12] 12  BP: (151-166)/(60-64) 166/60  FiO2 (%):  [40 %] 40 %    Physical Exam:     General Appearance:    nonresponsive, in no acute distress   Head:    Normocephalic, without obvious abnormality, R scalp subdermal  shunt palpable.  Contusion to L forehead, rhythmic jaw motion   Eyes:            Lids and lashes normal, conjunctivae and sclerae normal, no   icterus, no pallor, corneas clear, pupils nonreactive to light   ENMT:   Ears appear intact with no abnormalities noted      No oral lesions, no thrush, oral mucosa moist.  7.0 OETT 20 @ lip   Neck:   No adenopathy, supple, trachea midline, no thyromegaly, no   carotid bruit, no JVD       Lungs/resp:     Normal effort, symmetric chest rise, no crepitus, clear to      auscultation bilaterally, no chest wall tenderness, resonant  to percussion throughout.    Decreased BS on L              Heart/CV:    Regular rhythm and normal rate, normal S1 and S2, no            murmur, no gallop, no rub, no click   Abdomen/GI:     Normal bowel sounds, no masses, no organomegaly, soft        non-tender, non-distended, no guarding, no rebound                tenderness   G/U:     Deferred   Extremities/MSK:   No clubbing, cyanosis or edema.  No deformities.    Pulses:   Pulses palpable and equal bilaterally   Skin:   No bleeding, bruising or rash except as noted above    Hem/Lymph:   No cervical or supraclavicular adenopathy.    Neurologic:   EMV = 3T, no response to noxious stimuli, + cough            Psychiatric:   nonresponsive.      Results Review:     I reviewed the patient's new clinical results.     Results from last 7 days  Lab Units 04/19/17  0149 04/19/17 0148   CREATININE mg/dL  --  1.70*   ALT (SGPT) U/L 10  --    AST (SGOT) U/L 21  --        Results from last 7 days  Lab Units 04/19/17  0149 04/19/17  0148   WBC 10*3/mm3 8.05  --    HEMOGLOBIN g/dL 10.5*  --    HEMOGLOBIN, POC g/dL  --  10.5*   HEMATOCRIT % 33.7*  --    HEMATOCRIT POC %  --  31*   PLATELETS 10*3/mm3 163  --        Results from last 7 days  Lab Units 04/19/17  0153   PH, ARTERIAL pH units 7.272*   PO2 ART mm Hg 59.8*   PCO2, ARTERIAL mm Hg 56.9*   HCO3 ART mmol/L 26.2*       I reviewed the patient's new imaging including images and reports.  CXR shows some volume loss at left base with ETT in place and ICD in place.     Medication Review:     chlorhexidine 15 mL Mouth/Throat Q12H   pantoprazole 40 mg Intravenous Q24H       sodium chloride 100 mL/hr       Assessment/Plan     Hospital Problem List     * (Principal)Subdural hematoma, acute    Acute respiratory failure    CAD (coronary artery disease)    Congestive heart failure, NYHA class III    Diabetes mellitus; on insulin therapy    CKD (chronic kidney disease)    Hypertension    Hypothyroidism    Peripheral vascular disease     Dyslipidemia    H/O Stroke w/ residual left-sided weakness    Overview Signed 9/20/2016  1:57 PM by Jessica Gomez     History of cerebrovascular accident in 1990 with left-sided weakness.             82 YO female with fall and large SDH with subsequent AMS and respiratory failure.  Currently unresponsive and felt to be a non-operative candidate by neurosurgery.      Patient's family reports that she would not want long term life support.  They wish her to be DNR based on her previously expressed wishes in the event of cardiac arrest.      Plan:  - Neuro ICU  - maintain [facility] vent for now  - family conference in am  - Palliative Care Consult, AND in event of cardiac arrest  -  2 units of platelets STAT  - NS consult in am  - PPI for GI PPX/ mech VTE PPX  - check TSH, IV synthroid  - SSI  - gentle IV hydration given H/O CKD and CHF   - empiric Keppra, EEG in am    ARPITA Montilla  04/19/17  3:24 AM  Armando Germain MD    I performed an independent history and physical examination. Portions of the history were obtained by ARPITA Knight and were modified by me according to my findings. The above note reflects my findings, assessment, and plan.    Armando Germain MD    45 minutes of critical care provided. This time excludes other billable procedures. Time does include preparation of documents, medical consultations, review of old records, and direct bedside care. Patient was at high risk for life-threatening deterioration due to acute respiratory failure, SDH.

## 2017-04-19 NOTE — PLAN OF CARE
Problem: Patient Care Overview (Adult)  Goal: Plan of Care Review  Outcome: Ongoing (interventions implemented as appropriate)    04/19/17 5154   Coping/Psychosocial Response Interventions   Plan Of Care Reviewed With patient;family   Patient Care Overview   Progress no change

## 2017-04-19 NOTE — ED PROVIDER NOTES
Subjective   HPI Comments: 81 y.o. female presents to the ED w/ c/o decreased responsiveness tonight. Pt last seen well by family around 2200 when she went bed. Family checked on her a few hours later to find that she was slumped over in her chair, globally weak, decreased responsive, and not following commands. On EMS arrival pt vomited several times with concern for aspiration. She was intubated and transferred to flight crew, who placed her on ketamine and fentanyl. Her HR and BP dropped shortly before landing, and she was given a push dose epinephrine by flight crew.    PMHx significant for HTN, DM, and CVA. Pt has an AICD and is on Plavix.    Patient is a 81 y.o. female presenting with neurologic complaint.   History provided by:  EMS personnel and relative  History limited by:  Patient unresponsive, acuity of condition and intubated  Neurologic Problem   This is a new problem. The current episode started today. The last time the patient was known to be well was 4/18/2017 10:00 PM.  The problem has been rapidly worsening since onset. There was no focality noted. Associated symptoms include vomiting. Her past medical history is significant for a CVA.       Review of Systems   Unable to perform ROS: Intubated   Gastrointestinal: Positive for vomiting.       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   • TIA (transient ischemic attack) 04/01/2013    POSSIBLE TIA       No Known Allergies    Past Surgical History:   Procedure Laterality Date   • CHOLECYSTECTOMY     • CSF SHUNT     • EYE SURGERY Bilateral     CATARACTS   •  HYSTERECTOMY     • JOINT REPLACEMENT     • ORTHOPEDIC SURGERY Right     RIGHT ARM SURGERY    • TOTAL KNEE ARTHROPLASTY         History reviewed. No pertinent family history.    Social History     Social History   • Marital status:      Spouse name: N/A   • Number of children: N/A   • Years of education: N/A     Occupational History   • Homemaker      Social History Main Topics   • Smoking status: Never Smoker   • Smokeless tobacco: Never Used   • Alcohol use No   • Drug use: No   • Sexual activity: Defer     Other Topics Concern   • None     Social History Narrative         Objective   Physical Exam   Constitutional: She appears well-developed and well-nourished. She is intubated.   HENT:   Head: Normocephalic and atraumatic.   Eyes: Right pupil is not reactive. Left pupil is not reactive.   Pupils constricted 2mm bilaterally. No corneal reflex.   Cardiovascular: Normal rate, regular rhythm, normal heart sounds and intact distal pulses.    Pulmonary/Chest: She is intubated.   Breath sounds initially absent on left. Tube pulled back 2 cm and now present bilaterally.   Abdominal: Soft. She exhibits no distension.   Musculoskeletal: She exhibits edema (BLE). She exhibits no deformity.   Neurological: She is unresponsive. GCS eye subscore is 1. GCS verbal subscore is 1. GCS motor subscore is 1.   Unresponsive to painful stimuli.   Skin: Skin is warm and dry.   Nursing note and vitals reviewed.      Critical Care  Performed by: JAMSHID EAST  Authorized by: JAMSHID EAST   Total critical care time: 60 minutes  Critical care time was exclusive of separately billable procedures and treating other patients.  Critical care was necessary to treat or prevent imminent or life-threatening deterioration of the following conditions: CNS failure or compromise.  Critical care was time spent personally by me on the following activities: development of treatment plan with patient or surrogate, discussions with  consultants, evaluation of patient's response to treatment, ordering and performing treatments and interventions, pulse oximetry, ventilator management, examination of patient, ordering and review of laboratory studies, re-evaluation of patient's condition, review of old charts, obtaining history from patient or surrogate and ordering and review of radiographic studies.               ED Course  ED Course   Comment By Time   Case discussed with Dr. Dexter who will evaluate the patient in the emergency department. Alberto Sanders MD 04/19 0136   I discussed the CT scan with the radiologist.  They do report the 1.7 cm left subdural hematoma as well as a smaller right frontal subdural hematoma. Alberto Sanders MD 04/19 0146   Dr. Dexter at bedside and evaluated the patient. Alberto Sanders MD 04/19 0200   Case discussed with Dr. Germain who agrees with the plan for admission. Alberto Sanders MD 04/19 0207       Recent Results (from the past 24 hour(s))   POC Protime / INR    Collection Time: 04/19/17  1:47 AM   Result Value Ref Range    Protime 15.0 12.8 - 15.2 seconds    INR 1.3 (H) 0.8 - 1.2   POC CHEM 8    Collection Time: 04/19/17  1:48 AM   Result Value Ref Range    Glucose 168 (H) 70 - 130 mg/dL    BUN, Arterial 35 (H) 8 - 26 mg/dL    Creatinine 1.70 (H) 0.60 - 1.30 mg/dL    Sodium 141 138 - 146 mmol/L    Potassium 3.8 3.5 - 4.9 mmol/L    Chloride 103 98 - 109 mmol/L    Total CO2 27 24 - 29 mmol/L    Hemoglobin 10.5 (L) 12.0 - 17.0 g/dL    Hematocrit 31 (L) 38 - 51 %    Ionized Calcium 1.34 (H) 1.20 - 1.32 mmol/L   aPTT    Collection Time: 04/19/17  1:49 AM   Result Value Ref Range    PTT 26.8 24.0 - 31.0 seconds   AST    Collection Time: 04/19/17  1:49 AM   Result Value Ref Range    AST (SGOT) 21 0 - 33 U/L   ALT    Collection Time: 04/19/17  1:49 AM   Result Value Ref Range    ALT (SGPT) 10 7 - 40 U/L   Light Blue Top    Collection Time: 04/19/17  1:49 AM   Result Value Ref Range    Extra Tube  hold for add-on    Green Top (Gel)    Collection Time: 04/19/17  1:49 AM   Result Value Ref Range    Extra Tube Hold for add-ons.    Lavender Top    Collection Time: 04/19/17  1:49 AM   Result Value Ref Range    Extra Tube hold for add-on    CBC Auto Differential    Collection Time: 04/19/17  1:49 AM   Result Value Ref Range    WBC 8.05 3.50 - 10.80 10*3/mm3    RBC 3.66 (L) 3.89 - 5.14 10*6/mm3    Hemoglobin 10.5 (L) 11.5 - 15.5 g/dL    Hematocrit 33.7 (L) 34.5 - 44.0 %    MCV 92.1 80.0 - 99.0 fL    MCH 28.7 27.0 - 31.0 pg    MCHC 31.2 (L) 32.0 - 36.0 g/dL    RDW 14.7 (H) 11.3 - 14.5 %    RDW-SD 49.7 37.0 - 54.0 fl    MPV 9.8 6.0 - 12.0 fL    Platelets 163 150 - 450 10*3/mm3    Neutrophil % 64.6 41.0 - 71.0 %    Lymphocyte % 26.5 24.0 - 44.0 %    Monocyte % 6.7 0.0 - 12.0 %    Eosinophil % 1.6 0.0 - 3.0 %    Basophil % 0.2 0.0 - 1.0 %    Immature Grans % 0.4 0.0 - 0.6 %    Neutrophils, Absolute 5.20 1.50 - 8.30 10*3/mm3    Lymphocytes, Absolute 2.13 0.60 - 4.80 10*3/mm3    Monocytes, Absolute 0.54 0.00 - 1.00 10*3/mm3    Eosinophils, Absolute 0.13 0.10 - 0.30 10*3/mm3    Basophils, Absolute 0.02 0.00 - 0.20 10*3/mm3    Immature Grans, Absolute 0.03 0.00 - 0.03 10*3/mm3   TSH    Collection Time: 04/19/17  1:49 AM   Result Value Ref Range    TSH 1.359 0.350 - 5.350 mIU/mL   Lipid Panel    Collection Time: 04/19/17  1:49 AM   Result Value Ref Range    Total Cholesterol 111 0 - 200 mg/dL    Triglycerides 165 (H) 0 - 150 mg/dL    HDL Cholesterol 35 (L) 40 - 60 mg/dL    LDL Cholesterol  39 0 - 130 mg/dL   Comprehensive Metabolic Panel    Collection Time: 04/19/17  1:49 AM   Result Value Ref Range    Glucose 162 (H) 70 - 100 mg/dL    BUN 38 (H) 9 - 23 mg/dL    Creatinine 1.60 (H) 0.60 - 1.30 mg/dL    Sodium 140 132 - 146 mmol/L    Potassium 3.9 3.5 - 5.5 mmol/L    Chloride 107 99 - 109 mmol/L    CO2 28.0 20.0 - 31.0 mmol/L    Calcium 9.2 8.7 - 10.4 mg/dL    Total Protein 6.5 5.7 - 8.2 g/dL    Albumin 3.60 3.20 - 4.80 g/dL     ALT (SGPT) 12 7 - 40 U/L    AST (SGOT) 20 0 - 33 U/L    Alkaline Phosphatase 95 25 - 100 U/L    Total Bilirubin 0.2 (L) 0.3 - 1.2 mg/dL    eGFR Non African Amer 31 (L) >60 mL/min/1.73    Globulin 2.9 gm/dL    A/G Ratio 1.2 (L) 1.5 - 2.5 g/dL    BUN/Creatinine Ratio 23.8 7.0 - 25.0    Anion Gap 5.0 3.0 - 11.0 mmol/L   Magnesium    Collection Time: 04/19/17  1:49 AM   Result Value Ref Range    Magnesium 1.9 1.3 - 2.7 mg/dL   Phosphorus    Collection Time: 04/19/17  1:49 AM   Result Value Ref Range    Phosphorus 3.7 2.4 - 5.1 mg/dL   Hemoglobin A1c    Collection Time: 04/19/17  1:49 AM   Result Value Ref Range    Hemoglobin A1C 7.10 (H) 4.80 - 5.60 %   Blood Gas, Arterial    Collection Time: 04/19/17  1:53 AM   Result Value Ref Range    Site Arterial: right radial     Homero's Test N/A     pH, Arterial 7.272 (L) 7.350 - 7.450 pH units    pCO2, Arterial 56.9 (H) 35.0 - 45.0 mm Hg    pO2, Arterial 59.8 (L) 83.0 - 108.0 mm Hg    HCO3, Arterial 26.2 (H) 20.0 - 26.0 mmol/L    Base Excess, Arterial -0.7 (L) 0.0 - 2.0 mmol/L    Hemoglobin, Blood Gas 10.4 (L) 14 - 18 g/dL    Hematocrit, Blood Gas 31.8 %    Oxyhemoglobin 85.9 (L) 94 - 99 %    Methemoglobin 1.1 0 - 1.5 %    Carboxyhemoglobin 1.0 0 - 2 %    CO2 Content 27.9 22 - 33    Barometric Pressure for Blood Gas  mmHg    Modality Ventilator     FIO2 40 %   Urinalysis With / Culture If Indicated    Collection Time: 04/19/17  1:57 AM   Result Value Ref Range    Color, UA Dark Yellow (A) Yellow, Straw    Appearance, UA Cloudy (A) Clear    pH, UA 5.0 5.0 - 8.0    Specific Gravity, UA 1.020 1.001 - 1.030    Glucose, UA Negative Negative    Ketones, UA Negative Negative    Bilirubin, UA Small (1+) (A) Negative    Blood, UA Negative Negative    Protein, UA Trace (A) Negative    Leuk Esterase, UA Negative Negative    Nitrite, UA Negative Negative    Urobilinogen, UA 1.0 E.U./dL 0.2 - 1.0 E.U./dL   Urinalysis, Microscopic Only    Collection Time: 04/19/17  1:57 AM   Result Value  Ref Range    RBC, UA 0-2 None Seen, 0-2 /HPF    WBC, UA 0-2 (A) None Seen /HPF    Bacteria, UA None Seen None Seen, Trace /HPF    Squamous Epithelial Cells, UA  None Seen, 0-2 /HPF    Methodology Automated Microscopy    POC Troponin, Rapid    Collection Time: 04/19/17  2:04 AM   Result Value Ref Range    Troponin I 0.03 0.00 - 0.07 ng/mL   Type & Screen    Collection Time: 04/19/17  2:25 AM   Result Value Ref Range    ABO Type O     RH type Positive     Antibody Screen Negative      Note: In addition to lab results from this visit, the labs listed above may include labs taken at another facility or during a different encounter within the last 24 hours. Please correlate lab times with ED admission and discharge times for further clarification of the services performed during this visit.    CT Head Without Contrast   Final Result   Abnormal   Addendum 1 of 1   IMPRESSION:        Large LEFT frontoparietal and temporal ACUTE SUBDURAL HEMATOMA with    trace    subarachnoid extension and small subdural hematoma along the RIGHT    frontoparietal convexity, along the falx and tentorium.        Mass effect, midline shift to the RIGHT by 8-9 mm with subfalcine    herniation.     Recommend neurosurgical consultation.      THIS REPORT CONTAINS FINDINGS THAT MAY BE CRITICAL TO PATIENT CARE. The    findings were verbally communicated via telephone conference with    [Alberto Sanders] at 1:46 AM EDT on 4/19/2017. The findings were acknowledged and    understood.      THIS DOCUMENT HAS BEEN ELECTRONICALLY SIGNED BY GRACE MAIER MD      Final     Large LEFT frontoparietal and temporal ACUTE SUBDURAL HEMATOMA with trace    subarachnoid extension and small subdural hematoma along the RIGHT    frontoparietal convexity, along the falx and tentorium.        Mass effect, midline shift to the RIGHT by 8-9 mm with subfalcine herniation.     Recommend neurosurgical consultation.      THIS DOCUMENT HAS BEEN ELECTRONICALLY SIGNED BY GRACE  LIVIER HURLEY      XR Chest 1 View    (Results Pending)     Vitals:    04/19/17 0315 04/19/17 0316 04/19/17 0345 04/19/17 0400   BP: 178/63   115/58   BP Location:    Right arm   Patient Position:    Lying   Pulse: 65   74   Resp:    12   Temp:   95.5 °F (35.3 °C) 96.1 °F (35.6 °C)   TempSrc:   Core Core   SpO2: 100%   100%   Weight:  178 lb (80.7 kg)     Height:         Medications   sodium chloride 0.9 % flush 10 mL (not administered)   chlorhexidine (PERIDEX) 0.12 % solution 15 mL (15 mL Mouth/Throat Given 4/19/17 0457)   pantoprazole (PROTONIX) injection 40 mg (not administered)   sodium chloride 0.9 % flush 1-10 mL (not administered)   ondansetron (ZOFRAN) injection 4 mg (not administered)   sodium chloride 0.9 % infusion (100 mL/hr Intravenous New Bag 4/19/17 0345)   levothyroxine sodium injection 75 mcg (not administered)   dextrose (GLUTOSE) oral gel 15 g (not administered)   dextrose (D50W) solution 25 g (not administered)   glucagon (GLUCAGEN) injection 1 mg (not administered)   insulin regular (humuLIN R,novoLIN R) injection 0-14 Units (not administered)   levETIRAcetam in NaCl 0.82% (KEPPRA) IVPB 500 mg (not administered)     ECG/EMG Results (last 24 hours)     ** No results found for the last 24 hours. **                      MDM  Number of Diagnoses or Management Options  CKD (chronic kidney disease), unspecified stage:   Diabetes mellitus type 2 in obese:   Essential hypertension:   Long term (current) use of antithrombotics/antiplatelets:   Subdural hematoma, acute:   Diagnosis management comments: ECG/EMG Results (last 24 hours)     Procedure Component Value Units Date/Time    ECG 12 Lead ([id number]) Collected:  04/19/17 0208     Updated:  04/19/17 0247    Narrative:       Test Reason : Acute Stroke Protocol (onset < 12 hrs)  Blood Pressure : **/** mmHG  Vent. Rate : 060 BPM     Atrial Rate : 060 BPM     P-R Int : 188 ms          QRS Dur : 182 ms      QT Int : 536 ms       P-R-T Axes : 000 -03 102  degrees     QTc Int : 536 ms    Electronic atrial pacemaker  Left bundle branch block  Abnormal ECG  When compared with ECG of 03-FEB-2014 07:08,  Previous ECG has undetermined rhythm, needs review  Left bundle branch block has replaced Right bundle branch block  Criteria for Septal infarct are no longer present  Confirmed by JAMSHID SANDERS MD ([id number]) on 4/19/2017 2:27:37 AM    Referred By:             Confirmed By:JAMSHID SANDERS MD             Amount and/or Complexity of Data Reviewed  Clinical lab tests: reviewed and ordered  Tests in the radiology section of CPT®: reviewed and ordered  Decide to obtain previous medical records or to obtain history from someone other than the patient: yes  Obtain history from someone other than the patient: yes  Review and summarize past medical records: yes  Discuss the patient with other providers: yes  Independent visualization of images, tracings, or specimens: yes        Final diagnoses:   Subdural hematoma, acute   CKD (chronic kidney disease), unspecified stage   Essential hypertension   Diabetes mellitus type 2 in obese   Long term (current) use of antithrombotics/antiplatelets       Documentation assistance provided by jazmín Jones.  Information recorded by the scribe was done at my direction and has been verified and validated by me.     Lee Jones  04/19/17 0147       Jamshid Sanders MD  04/19/17 0508

## 2017-04-19 NOTE — PROGRESS NOTES
Adult Nutrition    Patient Name:  Tracy Gutierrez  YOB: 1935  MRN: [id number]  Admit Date:  4/19/2017    Assessment Date:  4/19/2017        Reason for Assessment       04/19/17 1434    Reason for Assessment    Reason For Assessment/Visit multidisciplinary rounds    Time Spent (min) 15              Nutrition/Diet History       04/19/17 1434    Nutrition/Diet History    Other per palliative RN note (4/19)-  Family would like to wait for pt's other daughters to return this afternoon, and expect that they will wish to proceed with extubation shortly thereafter                    Nutrition Prescription Ordered       04/19/17 1435    Nutrition Prescription PO    Current PO Diet NPO                    Nutrition Intervention       04/19/17 1435    Nutrition Intervention    RD/Tech Action Follow Tx progress;Care plan reviewd              Education/Evaluation       04/19/17 1435    Monitor/Evaluation    Monitor Per protocol          Electronically signed by:  Maria Guadalupe Lee MS RD/LD CNSC  04/19/17 2:35 PM

## 2017-04-19 NOTE — CONSULTS
NEUROSURGERY CONSULT    Referring Provider: No ref. provider found  Reason for Consultation: Subdural hematoma    Patient Care Team:  Kate Robles MD as PCP - General    Chief complaint: Coma    Subjective .     History of present illness:  The patient is an 81-year-old woman who was in her usual state of health until about 10:45 PM last night.  When talking to her relatives she collapsed to the floor, struck her head against a piano, and became unconscious on the floor.  She vomited multiple times.  An ambulance picked her up and took her a short distance.  She was intubated and flown by air helicopter ambulance to [facility].  CT scan of the head was completed at 1:27 AM.  I saw her at 1:45 AM with a diagnosis of large acute left subdural hematoma.  At that time the patient was deeply comatose, but had been sedated for the intubation and helicopter ride and had not fully emerge from sedation.  Pupils at that time were small at about 2 mm and nonreactive.  She was flaccid and unresponsive to pain.  There was no corneal response or doll's eye response.  She had trace swallow spontaneously.  Review of the CT scan of the head showed a very large acute subdural hematoma on the left side with a more chronic external component, a shift that I measured to be 12.5 mm from left to right of the midline, and a small acute subdural subdural over the right hemisphere as well extending to the subtemporal region.  The CT also showed extreme compression of the midbrain at the tentorial notch which shift from left to right.  At that time she was deemed beyond surgical remedy.  The [facility] medical record system went down for routine maintenance at 2 AM [place] note could not be dictated at time.    She has been transferred to the intensive care unit and maintained on a ventilator.  The family is present.  She remains deeply comatose.  Pupils are slightly larger at about 3-1/2-4 mm, nonreactive.  Corneal and awls  are responses are absent.  Spontaneous swallow occurs in spontaneous ventilation is seen.  She remains flaccid in all 4 extremities and unresponsive to pain.    Results Review:   CT scan of the head shows a very large acute subdural hematoma on the left side with a more external chronic component, a left-to-right midline shift of 12 mm, a ventriculoperitoneal shunt entering the right lateral ventricle from the frontal region, significant compression and distortion of the mid brain from herniation from the left sided mass, and a small acute right subdural hematoma including a subtemporal component.    Review of Systems  Pertinent items are noted in HPI, all other systems reviewed.    History  Past Medical History:   Diagnosis Date   • Arthritis    • CAD (coronary artery disease)    • Congestive heart failure, NYHA class III    • Diabetes mellitus    • Dyslipidemia    • Hypertension    • Hypothyroidism    • Left bundle branch block    • Leukemia 01/01/1987   • Lower extremity edema    • Obesity    • Peripheral vascular disease    • Stroke 01/01/1990    History of cerebrovascular accident in 1990 with left-sided weakness.   • TIA (transient ischemic attack) 04/01/2013    POSSIBLE TIA   ,   Past Surgical History:   Procedure Laterality Date   • CHOLECYSTECTOMY     • CSF SHUNT     • EYE SURGERY Bilateral     CATARACTS   • HYSTERECTOMY     • JOINT REPLACEMENT     • ORTHOPEDIC SURGERY Right     RIGHT ARM SURGERY    • TOTAL KNEE ARTHROPLASTY     , History reviewed. No pertinent family history.,   Social History   Substance Use Topics   • Smoking status: Never Smoker   • Smokeless tobacco: Never Used   • Alcohol use No   ,   Prescriptions Prior to Admission   Medication Sig Dispense Refill Last Dose   • amLODIPine (NORVASC) 5 MG tablet Take 10 mg by mouth daily.   Taking   • carvedilol (COREG) 12.5 MG tablet Take 12.5 mg by mouth 2 (two) times a day with meals.   Taking   • clopidogrel (PLAVIX) 75 MG tablet Take 1 tablet by  "mouth daily. 90 tablet 2 Taking   • esomeprazole (NexIUM) 40 MG capsule Take 40 mg by mouth every morning before breakfast.   Taking   • furosemide (LASIX) 20 MG tablet Take 1 tablet by mouth daily. 90 tablet 2 Taking   • hydrALAZINE (APRESOLINE) 50 MG tablet Take 50 mg by mouth 2 (two) times a day.   Taking   • insulin glargine (LANTUS) 100 UNIT/ML injection Inject 10 Units under the skin every morning.   Taking   • insulin glargine (LANTUS) 100 UNIT/ML injection Inject 15 Units under the skin every night.   Taking   • levothyroxine (SYNTHROID, LEVOTHROID) 125 MCG tablet Take 125 mcg by mouth daily.   Taking   • naproxen (EC NAPROSYN) 500 MG EC tablet Take 1 tablet by mouth 2 (two) times a day as needed for mild pain (1-3). 12 tablet 0 Taking   • sertraline (ZOLOFT) 50 MG tablet Take 50 mg by mouth daily.   Taking   • simvastatin (ZOCOR) 80 MG tablet Take 80 mg by mouth every night.   Taking   • temazepam (RESTORIL) 15 MG capsule Take 15 mg by mouth at night as needed for sleep.   Taking   • tolterodine LA (DETROL LA) 4 MG 24 hr capsule Take 4 mg by mouth daily.   Taking    and Allergies:  Review of patient's allergies indicates no known allergies.    Objective     Vital Signs   Blood pressure 115/58, pulse 74, temperature 96.1 °F (35.6 °C), temperature source Core, resp. rate 18, height 65\" (165.1 cm), weight 178 lb (80.7 kg), SpO2 99 %.    Physical Exam:  NEUROLOGICAL EXAMINATION:      MENTAL STATUS:  Deeply comatose     CRANIAL NERVES:    Pupils are fixed and mid position, nonreactive to light.  Doll's eye response is absent.  Corneal responses are absent.  Occasional spontaneous breath that occurs; intubated and on the ventilator.  Occasional spontaneous swallow response.    MOTOR:  Flaccid all 4 extremities, no response to pain.    SENSATION:  No response to pinch in any extremity.    REFLEXES:  DTR absent in upper and lower extremities.      Assessment/Plan     DIAGNOSIS: Massive spontaneous acute right sided " subdural hematoma with brain and brainstem herniation causing deep coma.  There is also a chronic subdural component on the left side, and a small acute subdural hematoma on the right side.  She has sustained irreversible brain stem compression and injury, and is not a surgical candidate.    RECOMMENDATION: Terminal care in intensive care for now.    Vinayak Dexter MD  04/19/17  5:38 AM

## 2017-04-19 NOTE — PLAN OF CARE
Problem: Pressure Ulcer Risk (Jose Armando Scale) (Adult,Obstetrics,Pediatric)  Goal: Identify Related Risk Factors and Signs and Symptoms  Outcome: Ongoing (interventions implemented as appropriate)  Goal: Skin Integrity  Outcome: Ongoing (interventions implemented as appropriate)    04/19/17 0509   Pressure Ulcer Risk (Jose Armando Scale) (Adult,Obstetrics,Pediatric)   Skin Integrity making progress toward outcome

## 2017-04-19 NOTE — PROGRESS NOTES
No change or improvement in status as expected. Remains hemodynamically stable but has coarse diffuse rhonchi. Neurologically is comatose with upgoing toes and unresponsive to discomfort. Remains on ventilatory support and has high FiO2 so I presume she aspirated and is developing acute lung injury (other alternatives is increased airway resistance due to mucous plugging). Discuss the situation with the family and confirmed their wishes for DO NOT RESUSCITATE. Other family members are coming and they will make additional decisions regarding withdrawal of support. I have counseled to palliative care and appreciate their help regarding end-of-life care and possibly withdraw support.                                         Adama Yan MD  Pulmonary and Critical Care Medicine  04/19/17 5:50 PM

## 2017-04-19 NOTE — SIGNIFICANT NOTE
Palliative Team Meeting Attendance  13:00  JACQUELYN Weaver RN, PN              DO BEE Banks RN, PN   ARPITA Rob M.Div., [facility], [facility]              JONO DeleonW, [facility]-   GLYNN Thrasher, RN, PN

## 2017-04-19 NOTE — PLAN OF CARE
Problem: Patient Care Overview (Adult)  Goal: Plan of Care Review  Outcome: Ongoing (interventions implemented as appropriate)    04/19/17 5021   Coping/Psychosocial Response Interventions   Plan Of Care Reviewed With daughter;family   Patient Care Overview   Progress declining   Outcome Evaluation   Outcome Summary/Follow up Plan Discussion with daughter and granddaughter, with palliative APRN. Family stated pt has previously stated would not want to be maintained on life support; that they realize this is a terminal condition and meaningful recovery is not expected. Family would like to wait for pt's other daughters to return this afternoon, and expect that they will wish to proceed with extubation shortly thereafter. Advised unit RN Palliative available for orders for comfort if needed, and able to assist with extubation procedure if desired.

## 2017-04-19 NOTE — PLAN OF CARE
Problem: Respiratory Insufficiency (Adult)  Goal: Identify Related Risk Factors and Signs and Symptoms  Outcome: Ongoing (interventions implemented as appropriate)    04/19/17 1693   Respiratory Insufficiency   Related Risk Factors (Respiratory Insufficiency) artificial airway;bedrest;physiological factors   Signs and Symptoms (Respiratory Insufficiency) abnormal ABGs

## 2017-04-20 LAB
ABO + RH BLD: NORMAL
ABO + RH BLD: NORMAL
BH BB BLOOD EXPIRATION DATE: NORMAL
BH BB BLOOD EXPIRATION DATE: NORMAL
BH BB BLOOD TYPE BARCODE: 5100
BH BB BLOOD TYPE BARCODE: 6200
BH BB DISPENSE STATUS: NORMAL
BH BB DISPENSE STATUS: NORMAL
BH BB PRODUCT CODE: NORMAL
BH BB PRODUCT CODE: NORMAL
BH BB UNIT NUMBER: NORMAL
BH BB UNIT NUMBER: NORMAL
UNIT  ABO: NORMAL
UNIT  ABO: NORMAL
UNIT  RH: NORMAL
UNIT  RH: NORMAL

## 2017-04-20 NOTE — DISCHARGE SUMMARY
Death Summary         Patient Name: Tracy Gutierrez    CSN: [id number]    MRN: [id number]    : 1935    Today's Date: 17    Date of Admission: 2017    Date/Time of Death: 2017 at 10:34 PM    Admitting Physician:  Armando Germain MD    Primary Care Provider: Kate Robles MD    Consultations:  Vinayak Dexter MD, Neurosurgery       Admission Diagnosis: Massive acute right sided subdural hematoma with herniation    Diagnoses at Time of Death:   Principal Problem:    Massive acute right sided subdural hematoma with herniation in deep coma secondary to trauma 17. Not operative candidate  Active Problems:    CAD with stents  and     Congestive heart failure with LVEF improved to 50% on echo  after BIV ICD     Peripheral vascular disease with 90% left renal artery stenosis s/p left renal 2007    Hypertension    Dyslipidemia    Diabetes mellitus; on insulin therapy    H/O Stroke  w/ residual left-sided weakness    Hypothyroidism    Acute respiratory failure due to neurologic event    CKD (chronic kidney disease)    Ischemic cardiomyopathy s/p BIV ICD 2007    History of leukemia         Procedures: None           HPI     History of Present Illness:    Tracy Gutierrez is a 81 y.o. female who  has a past medical history of Arthritis; CAD (coronary artery disease); Congestive heart failure, NYHA class III; Diabetes mellitus; Dyslipidemia; Hypertension; Hypothyroidism; Left bundle branch block; Leukemia (1987); Lower extremity edema; Obesity; Peripheral vascular disease; Stroke (1990); and TIA (transient ischemic attack) (2013).  The patient was LKW at approximately 2200 when she went to bed. Apprarently she had fallen earlier in the evening and hit the L side of her head against the piano but was w/o complaint. Her family checked on her around midnight and found her slumped over w/ decreased mental status. Upon EMS arrival the patient was having  frequent emesis and difficulty protecting airway and so patient was intubated and transferred to air transport. Shortly before arriving at [facility] she had an incidence of decreased BP and HR and received 1 mg of Epi. Upon arrival here she was taken to CT scan which revealed a large L SDH. Dr. Sanders spoke w/ Dr. Dexter (Neurosurgery) who evaluated the patient. Dr. Dexter felt the patient was not an operative candidate. Prognosis is poor. The patient is on ASA/Plavix at home and has had 2 units of platelets ordered in the ED. The patient's daughter is present at the bedside.     Hospital Course       Hospital Course:  Ms. Gutierrez was admitted to the neuro ICU intubated and mechanically ventilated. Palliative care was consulted and the patient was made a comfort measures only status by family as she had made her wishes known that she would not want long term life support. She had a biventricular pacemaker since  and a magnet was placed. On  at 8:30 PM she was palliatively extubated and she  at 10:34 PM on  with her family by her side. Disposition of the body was coordinated by the clinical house supervisor.       ARPITA Queen  Pulmonary & Critical Care Medicine  17 7:35 AM                  *Please note that portions of this note were completed with a voice recognition program. Efforts were made to edit the dictations, but occasionally words are mistranscribed.

## 2017-04-20 NOTE — SIGNIFICANT NOTE
Exam confirms with auscultation zero audible heart tones and zero audible respirations. Ms.Georgia Gutierrez was pronounced dead at 2234 4/19/17.  MD notified by Patient's RN.    Jimmy Callaway RN  Clinical House Supervisor  4/19/2017 11:30 PM